# Patient Record
Sex: MALE | Race: OTHER | HISPANIC OR LATINO | URBAN - METROPOLITAN AREA
[De-identification: names, ages, dates, MRNs, and addresses within clinical notes are randomized per-mention and may not be internally consistent; named-entity substitution may affect disease eponyms.]

---

## 2021-07-12 VITALS
HEIGHT: 73 IN | TEMPERATURE: 98 F | DIASTOLIC BLOOD PRESSURE: 71 MMHG | HEART RATE: 56 BPM | OXYGEN SATURATION: 97 % | SYSTOLIC BLOOD PRESSURE: 126 MMHG | RESPIRATION RATE: 16 BRPM | WEIGHT: 169.76 LBS

## 2021-07-12 RX ORDER — POVIDONE-IODINE 5 %
1 AEROSOL (ML) TOPICAL ONCE
Refills: 0 | Status: COMPLETED | OUTPATIENT
Start: 2021-07-13 | End: 2021-07-13

## 2021-07-12 NOTE — H&P ADULT - PROBLEM SELECTOR PLAN 1
Admit to Orthopedic service  For elective L5-S1 decompression and fusion   Medically cleared for surgery by  Admit to Orthopedic service  For elective L5-S1 decompression and fusion   Medically cleared for surgery by Dr. Baca

## 2021-07-12 NOTE — H&P ADULT - NSHPLABSRESULTS_GEN_ALL_CORE
COVID vaccine (J&J) on 04/30/2021 Preop CBC, BMP, PT/INR, PTT WNL  SCr 0.97  Preop CXR WNL per clearance   Preop EKG WNL per clearance  Spirometry WNL per clearance   Echocardiogram - mild pulmonary regurgitation - reviewed per clearance  COVID vaccine on 04/30/2021  3M DOS  T&S DOS Preop CBC, BMP, PT/INR, PTT WNL  SCr 0.97  Preop CXR WNL per clearance   Preop EKG WNL per clearance  Spirometry WNL per clearance   Echocardiogram - mild pulmonary regurgitation - reviewed per clearance  COVID vaccine on 04/30/2021  Povidone iodine nasal swab to be given day of surgery   T&S DOS

## 2021-07-12 NOTE — H&P ADULT - HISTORY OF PRESENT ILLNESS
40 yo M with back pain x     Presents today for elective L5-S1 decompression and fusion. 40 y/o M with back pain, worsening without improvement despite conservative therapies.  Presents today for elective L5-S1 decompression and fusion.

## 2021-07-12 NOTE — H&P ADULT - NSHPPHYSICALEXAM_GEN_ALL_CORE
MSK: decreased ROM of lumbar spine secondary to pain    Rest of PE per medical clearance Complete PE as per medical clearance note

## 2021-07-13 ENCOUNTER — INPATIENT (INPATIENT)
Facility: HOSPITAL | Age: 41
LOS: 2 days | Discharge: HOME | DRG: 460 | End: 2021-07-16
Attending: ORTHOPAEDIC SURGERY | Admitting: ORTHOPAEDIC SURGERY
Payer: OTHER MISCELLANEOUS

## 2021-07-13 DIAGNOSIS — Z98.890 OTHER SPECIFIED POSTPROCEDURAL STATES: Chronic | ICD-10-CM

## 2021-07-13 DIAGNOSIS — M54.16 RADICULOPATHY, LUMBAR REGION: ICD-10-CM

## 2021-07-13 LAB
BLD GP AB SCN SERPL QL: NEGATIVE — SIGNIFICANT CHANGE UP
RH IG SCN BLD-IMP: POSITIVE — SIGNIFICANT CHANGE UP

## 2021-07-13 RX ORDER — ONDANSETRON 8 MG/1
4 TABLET, FILM COATED ORAL EVERY 6 HOURS
Refills: 0 | Status: DISCONTINUED | OUTPATIENT
Start: 2021-07-13 | End: 2021-07-16

## 2021-07-13 RX ORDER — HYDROMORPHONE HYDROCHLORIDE 2 MG/ML
30 INJECTION INTRAMUSCULAR; INTRAVENOUS; SUBCUTANEOUS
Refills: 0 | Status: DISCONTINUED | OUTPATIENT
Start: 2021-07-13 | End: 2021-07-14

## 2021-07-13 RX ORDER — SENNA PLUS 8.6 MG/1
2 TABLET ORAL AT BEDTIME
Refills: 0 | Status: DISCONTINUED | OUTPATIENT
Start: 2021-07-13 | End: 2021-07-16

## 2021-07-13 RX ORDER — DEXAMETHASONE 0.5 MG/5ML
10 ELIXIR ORAL EVERY 8 HOURS
Refills: 0 | Status: COMPLETED | OUTPATIENT
Start: 2021-07-13 | End: 2021-07-14

## 2021-07-13 RX ORDER — NALOXONE HYDROCHLORIDE 4 MG/.1ML
0.1 SPRAY NASAL
Refills: 0 | Status: DISCONTINUED | OUTPATIENT
Start: 2021-07-13 | End: 2021-07-16

## 2021-07-13 RX ORDER — ACETAMINOPHEN 500 MG
975 TABLET ORAL EVERY 8 HOURS
Refills: 0 | Status: DISCONTINUED | OUTPATIENT
Start: 2021-07-13 | End: 2021-07-16

## 2021-07-13 RX ORDER — AMITRIPTYLINE HCL 25 MG
1 TABLET ORAL
Qty: 0 | Refills: 0 | DISCHARGE

## 2021-07-13 RX ORDER — HYDROMORPHONE HYDROCHLORIDE 2 MG/ML
0.5 INJECTION INTRAMUSCULAR; INTRAVENOUS; SUBCUTANEOUS
Refills: 0 | Status: DISCONTINUED | OUTPATIENT
Start: 2021-07-13 | End: 2021-07-16

## 2021-07-13 RX ORDER — CYCLOBENZAPRINE HYDROCHLORIDE 10 MG/1
5 TABLET, FILM COATED ORAL THREE TIMES A DAY
Refills: 0 | Status: DISCONTINUED | OUTPATIENT
Start: 2021-07-13 | End: 2021-07-16

## 2021-07-13 RX ORDER — CHLORHEXIDINE GLUCONATE 213 G/1000ML
1 SOLUTION TOPICAL ONCE
Refills: 0 | Status: COMPLETED | OUTPATIENT
Start: 2021-07-13 | End: 2021-07-13

## 2021-07-13 RX ORDER — CEFAZOLIN SODIUM 1 G
2000 VIAL (EA) INJECTION EVERY 8 HOURS
Refills: 0 | Status: COMPLETED | OUTPATIENT
Start: 2021-07-14 | End: 2021-07-14

## 2021-07-13 RX ORDER — APREPITANT 80 MG/1
40 CAPSULE ORAL ONCE
Refills: 0 | Status: COMPLETED | OUTPATIENT
Start: 2021-07-13 | End: 2021-07-13

## 2021-07-13 RX ORDER — ROBINUL 0.2 MG/ML
0.2 INJECTION INTRAMUSCULAR; INTRAVENOUS ONCE
Refills: 0 | Status: COMPLETED | OUTPATIENT
Start: 2021-07-13 | End: 2021-07-13

## 2021-07-13 RX ORDER — SODIUM CHLORIDE 9 MG/ML
1000 INJECTION, SOLUTION INTRAVENOUS
Refills: 0 | Status: DISCONTINUED | OUTPATIENT
Start: 2021-07-13 | End: 2021-07-14

## 2021-07-13 RX ORDER — AMITRIPTYLINE HCL 25 MG
10 TABLET ORAL AT BEDTIME
Refills: 0 | Status: DISCONTINUED | OUTPATIENT
Start: 2021-07-13 | End: 2021-07-16

## 2021-07-13 RX ORDER — ACETAMINOPHEN 500 MG
1000 TABLET ORAL ONCE
Refills: 0 | Status: COMPLETED | OUTPATIENT
Start: 2021-07-13 | End: 2021-07-13

## 2021-07-13 RX ORDER — HYDROMORPHONE HYDROCHLORIDE 2 MG/ML
0.5 INJECTION INTRAMUSCULAR; INTRAVENOUS; SUBCUTANEOUS
Refills: 0 | Status: DISCONTINUED | OUTPATIENT
Start: 2021-07-13 | End: 2021-07-14

## 2021-07-13 RX ORDER — GABAPENTIN 400 MG/1
300 CAPSULE ORAL ONCE
Refills: 0 | Status: COMPLETED | OUTPATIENT
Start: 2021-07-13 | End: 2021-07-13

## 2021-07-13 RX ADMIN — APREPITANT 40 MILLIGRAM(S): 80 CAPSULE ORAL at 15:02

## 2021-07-13 RX ADMIN — Medication 102 MILLIGRAM(S): at 21:26

## 2021-07-13 RX ADMIN — Medication 1000 MILLIGRAM(S): at 15:02

## 2021-07-13 RX ADMIN — Medication 975 MILLIGRAM(S): at 21:31

## 2021-07-13 RX ADMIN — HYDROMORPHONE HYDROCHLORIDE 0.5 MILLIGRAM(S): 2 INJECTION INTRAMUSCULAR; INTRAVENOUS; SUBCUTANEOUS at 20:20

## 2021-07-13 RX ADMIN — Medication 1 APPLICATION(S): at 15:05

## 2021-07-13 RX ADMIN — HYDROMORPHONE HYDROCHLORIDE 30 MILLILITER(S): 2 INJECTION INTRAMUSCULAR; INTRAVENOUS; SUBCUTANEOUS at 20:31

## 2021-07-13 RX ADMIN — SENNA PLUS 2 TABLET(S): 8.6 TABLET ORAL at 21:31

## 2021-07-13 RX ADMIN — ROBINUL 0.2 MILLIGRAM(S): 0.2 INJECTION INTRAMUSCULAR; INTRAVENOUS at 19:15

## 2021-07-13 RX ADMIN — CYCLOBENZAPRINE HYDROCHLORIDE 5 MILLIGRAM(S): 10 TABLET, FILM COATED ORAL at 21:31

## 2021-07-13 RX ADMIN — GABAPENTIN 300 MILLIGRAM(S): 400 CAPSULE ORAL at 15:02

## 2021-07-13 RX ADMIN — Medication 10 MILLIGRAM(S): at 21:31

## 2021-07-13 RX ADMIN — CHLORHEXIDINE GLUCONATE 1 APPLICATION(S): 213 SOLUTION TOPICAL at 15:05

## 2021-07-13 RX ADMIN — HYDROMORPHONE HYDROCHLORIDE 0.5 MILLIGRAM(S): 2 INJECTION INTRAMUSCULAR; INTRAVENOUS; SUBCUTANEOUS at 20:03
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
## 2021-07-13 NOTE — CONSULT NOTE ADULT - ATTENDING COMMENTS
Pt is for lumbar decompression, is evaluated for pain control. Chart reviewed, plan reviewed and agreed. We will consider IV PCA and comprehensive regimen and titrate to pain control and side effects. We will follow up. Dr. Graves

## 2021-07-14 LAB
ANION GAP SERPL CALC-SCNC: 12 MMOL/L — SIGNIFICANT CHANGE UP (ref 5–17)
BASOPHILS # BLD AUTO: 0.01 K/UL — SIGNIFICANT CHANGE UP (ref 0–0.2)
BASOPHILS NFR BLD AUTO: 0.1 % — SIGNIFICANT CHANGE UP (ref 0–2)
BUN SERPL-MCNC: 12 MG/DL — SIGNIFICANT CHANGE UP (ref 7–23)
CALCIUM SERPL-MCNC: 9.4 MG/DL — SIGNIFICANT CHANGE UP (ref 8.4–10.5)
CHLORIDE SERPL-SCNC: 101 MMOL/L — SIGNIFICANT CHANGE UP (ref 96–108)
CO2 SERPL-SCNC: 23 MMOL/L — SIGNIFICANT CHANGE UP (ref 22–31)
COVID-19 SPIKE DOMAIN AB INTERP: POSITIVE
COVID-19 SPIKE DOMAIN ANTIBODY RESULT: >250 U/ML — HIGH
CREAT SERPL-MCNC: 0.81 MG/DL — SIGNIFICANT CHANGE UP (ref 0.5–1.3)
EOSINOPHIL # BLD AUTO: 0 K/UL — SIGNIFICANT CHANGE UP (ref 0–0.5)
EOSINOPHIL NFR BLD AUTO: 0 % — SIGNIFICANT CHANGE UP (ref 0–6)
GLUCOSE SERPL-MCNC: 149 MG/DL — HIGH (ref 70–99)
HCT VFR BLD CALC: 38 % — LOW (ref 39–50)
HGB BLD-MCNC: 12.5 G/DL — LOW (ref 13–17)
IMM GRANULOCYTES NFR BLD AUTO: 0.9 % — SIGNIFICANT CHANGE UP (ref 0–1.5)
LYMPHOCYTES # BLD AUTO: 0.69 K/UL — LOW (ref 1–3.3)
LYMPHOCYTES # BLD AUTO: 3.8 % — LOW (ref 13–44)
MCHC RBC-ENTMCNC: 27 PG — SIGNIFICANT CHANGE UP (ref 27–34)
MCHC RBC-ENTMCNC: 32.9 GM/DL — SIGNIFICANT CHANGE UP (ref 32–36)
MCV RBC AUTO: 82.1 FL — SIGNIFICANT CHANGE UP (ref 80–100)
MONOCYTES # BLD AUTO: 0.4 K/UL — SIGNIFICANT CHANGE UP (ref 0–0.9)
MONOCYTES NFR BLD AUTO: 2.2 % — SIGNIFICANT CHANGE UP (ref 2–14)
NEUTROPHILS # BLD AUTO: 16.97 K/UL — HIGH (ref 1.8–7.4)
NEUTROPHILS NFR BLD AUTO: 93 % — HIGH (ref 43–77)
NRBC # BLD: 0 /100 WBCS — SIGNIFICANT CHANGE UP (ref 0–0)
PLATELET # BLD AUTO: 255 K/UL — SIGNIFICANT CHANGE UP (ref 150–400)
POTASSIUM SERPL-MCNC: 4.2 MMOL/L — SIGNIFICANT CHANGE UP (ref 3.5–5.3)
POTASSIUM SERPL-SCNC: 4.2 MMOL/L — SIGNIFICANT CHANGE UP (ref 3.5–5.3)
RBC # BLD: 4.63 M/UL — SIGNIFICANT CHANGE UP (ref 4.2–5.8)
RBC # FLD: 13 % — SIGNIFICANT CHANGE UP (ref 10.3–14.5)
SARS-COV-2 IGG+IGM SERPL QL IA: >250 U/ML — HIGH
SARS-COV-2 IGG+IGM SERPL QL IA: POSITIVE
SODIUM SERPL-SCNC: 136 MMOL/L — SIGNIFICANT CHANGE UP (ref 135–145)
WBC # BLD: 18.24 K/UL — HIGH (ref 3.8–10.5)
WBC # FLD AUTO: 18.24 K/UL — HIGH (ref 3.8–10.5)

## 2021-07-14 PROCEDURE — 72100 X-RAY EXAM L-S SPINE 2/3 VWS: CPT | Mod: 26

## 2021-07-14 RX ORDER — HYDROMORPHONE HYDROCHLORIDE 2 MG/ML
0.5 INJECTION INTRAMUSCULAR; INTRAVENOUS; SUBCUTANEOUS
Refills: 0 | Status: DISCONTINUED | OUTPATIENT
Start: 2021-07-14 | End: 2021-07-16

## 2021-07-14 RX ORDER — OXYCODONE HYDROCHLORIDE 5 MG/1
5 TABLET ORAL EVERY 4 HOURS
Refills: 0 | Status: DISCONTINUED | OUTPATIENT
Start: 2021-07-14 | End: 2021-07-16

## 2021-07-14 RX ORDER — POLYETHYLENE GLYCOL 3350 17 G/17G
17 POWDER, FOR SOLUTION ORAL DAILY
Refills: 0 | Status: DISCONTINUED | OUTPATIENT
Start: 2021-07-14 | End: 2021-07-16

## 2021-07-14 RX ORDER — OXYCODONE HYDROCHLORIDE 5 MG/1
10 TABLET ORAL EVERY 4 HOURS
Refills: 0 | Status: DISCONTINUED | OUTPATIENT
Start: 2021-07-14 | End: 2021-07-16

## 2021-07-14 RX ADMIN — Medication 975 MILLIGRAM(S): at 23:14

## 2021-07-14 RX ADMIN — Medication 102 MILLIGRAM(S): at 18:15

## 2021-07-14 RX ADMIN — Medication 975 MILLIGRAM(S): at 13:02

## 2021-07-14 RX ADMIN — SODIUM CHLORIDE 125 MILLILITER(S): 9 INJECTION, SOLUTION INTRAVENOUS at 02:15

## 2021-07-14 RX ADMIN — OXYCODONE HYDROCHLORIDE 10 MILLIGRAM(S): 5 TABLET ORAL at 20:29

## 2021-07-14 RX ADMIN — Medication 1 TABLET(S): at 13:02

## 2021-07-14 RX ADMIN — HYDROMORPHONE HYDROCHLORIDE 0.5 MILLIGRAM(S): 2 INJECTION INTRAMUSCULAR; INTRAVENOUS; SUBCUTANEOUS at 15:35

## 2021-07-14 RX ADMIN — Medication 10 MILLIGRAM(S): at 22:13

## 2021-07-14 RX ADMIN — POLYETHYLENE GLYCOL 3350 17 GRAM(S): 17 POWDER, FOR SOLUTION ORAL at 13:02

## 2021-07-14 RX ADMIN — OXYCODONE HYDROCHLORIDE 10 MILLIGRAM(S): 5 TABLET ORAL at 17:41

## 2021-07-14 RX ADMIN — CYCLOBENZAPRINE HYDROCHLORIDE 5 MILLIGRAM(S): 10 TABLET, FILM COATED ORAL at 13:02

## 2021-07-14 RX ADMIN — CYCLOBENZAPRINE HYDROCHLORIDE 5 MILLIGRAM(S): 10 TABLET, FILM COATED ORAL at 06:46

## 2021-07-14 RX ADMIN — OXYCODONE HYDROCHLORIDE 10 MILLIGRAM(S): 5 TABLET ORAL at 21:29

## 2021-07-14 RX ADMIN — Medication 102 MILLIGRAM(S): at 11:02

## 2021-07-14 RX ADMIN — SENNA PLUS 2 TABLET(S): 8.6 TABLET ORAL at 22:13

## 2021-07-14 RX ADMIN — Medication 975 MILLIGRAM(S): at 14:02

## 2021-07-14 RX ADMIN — Medication 100 MILLIGRAM(S): at 10:28

## 2021-07-14 RX ADMIN — OXYCODONE HYDROCHLORIDE 10 MILLIGRAM(S): 5 TABLET ORAL at 13:11

## 2021-07-14 RX ADMIN — HYDROMORPHONE HYDROCHLORIDE 0.5 MILLIGRAM(S): 2 INJECTION INTRAMUSCULAR; INTRAVENOUS; SUBCUTANEOUS at 15:50

## 2021-07-14 RX ADMIN — OXYCODONE HYDROCHLORIDE 10 MILLIGRAM(S): 5 TABLET ORAL at 12:11

## 2021-07-14 RX ADMIN — Medication 100 MILLIGRAM(S): at 02:15

## 2021-07-14 RX ADMIN — Medication 975 MILLIGRAM(S): at 06:46

## 2021-07-14 RX ADMIN — Medication 975 MILLIGRAM(S): at 22:13

## 2021-07-14 RX ADMIN — Medication 975 MILLIGRAM(S): at 07:46

## 2021-07-14 RX ADMIN — CYCLOBENZAPRINE HYDROCHLORIDE 5 MILLIGRAM(S): 10 TABLET, FILM COATED ORAL at 22:13

## 2021-07-14 RX ADMIN — OXYCODONE HYDROCHLORIDE 10 MILLIGRAM(S): 5 TABLET ORAL at 16:41

## 2021-07-14 NOTE — CONSULT NOTE ADULT - SUBJECTIVE AND OBJECTIVE BOX
DEMARCO AVILA      Patient is a 41y old  Male who presents with a chief complaint of Back pain (14 Jul 2021 16:17)      HPI:  42 y/o M with back pain, worsening without improvement despite conservative therapies.  Presents today for elective L5-S1 decompression and fusion. (12 Jul 2021 09:12)      Addl  Medical issues:       HEALTH ISSUES - PROBLEM Dx:  Lumbar radiculopathy  Lumbar radiculopathy              MEDICATIONS  (STANDING):  acetaminophen   Tablet .. 975 milliGRAM(s) Oral every 8 hours  amitriptyline 10 milliGRAM(s) Oral at bedtime  cyclobenzaprine 5 milliGRAM(s) Oral three times a day  multivitamin 1 Tablet(s) Oral daily  polyethylene glycol 3350 17 Gram(s) Oral daily  senna 2 Tablet(s) Oral at bedtime    MEDICATIONS  (PRN):  bisacodyl Suppository 10 milliGRAM(s) Rectal daily PRN Constipation  HYDROmorphone  Injectable 0.5 milliGRAM(s) IV Push every 15 minutes PRN Severe Pain (7 - 10)  HYDROmorphone  Injectable 0.5 milliGRAM(s) IV Push every 2 hours PRN breakthrough pain  naloxone Injectable 0.1 milliGRAM(s) IV Push every 3 minutes PRN For ANY of the following changes in patient status:  A. RR LESS THAN 10 breaths per minute, B. Oxygen saturation LESS THAN 90%, C. Sedation score of 6  ondansetron Injectable 4 milliGRAM(s) IV Push every 6 hours PRN Nausea  oxyCODONE    IR 5 milliGRAM(s) Oral every 4 hours PRN Moderate Pain (4 - 6)  oxyCODONE    IR 10 milliGRAM(s) Oral every 4 hours PRN Severe Pain (7 - 10)          PAST MEDICAL & SURGICAL HISTORY:  No pertinent past medical history    History of surgery  left arm surgery        REVIEW OF SYSTEMS:  [x] As per HPI          Reviewed   no change                            Changes noted  CONSTITUTIONAL: No fever, weight loss, or fatigue  RESPIRATORY: No cough, wheezing, chills or hemoptysis; No Shortness of Breath  CARDIOVASCULAR: No chest pain, palpitations, dizziness, or leg swelling  GASTROINTESTINAL: No abdominal or epigastric pain. No nausea, vomiting, or hematemesis; No diarrhea or constipation. No melena or hematochezia.  MUSCULOSKELETAL: No joint pain or swelling; No muscle, back, or extremity pain  s/p L spine  Neuro:   Grossly  Negative  Psych        Awake  alert  [x] All others negative	  [ ] Unable to obtain      PHYSICAL EXAM:      Constitutional: NAD, well-groomed, well-developed  HEENT: PERRLA, EOMI, Normal Hearing, MMM  Neck: No LAD, No JVD  Back: Normal spine flexure, No CVA tenderness  s/p surg  Respiratory: CTA   Cardiovascular: S1 and S2, RRR, no M/G/R  Gastrointestinal: BS+, soft, NT/ND  Extremities: No peripheral edema  Vascular: 2+ peripheral pulses  Neurological: A/O x 3, no focal deficits  Psychiatric: Normal mood, normal affect  Musculoskeletal: 5/5 strength b/l upper and lower extremities  Skin: No rashes    Vital Signs Last 24 Hrs  T(C): 36.6 (14 Jul 2021 20:25), Max: 36.9 (14 Jul 2021 01:16)  T(F): 97.8 (14 Jul 2021 20:25), Max: 98.4 (14 Jul 2021 01:16)  HR: 79 (14 Jul 2021 20:25) (56 - 87)  BP: 133/71 (14 Jul 2021 20:25) (110/64 - 144/72)  BP(mean): 85 (14 Jul 2021 06:20) (85 - 95)  RR: 18 (14 Jul 2021 20:25) (16 - 21)  SpO2: 95% (14 Jul 2021 20:25) (95% - 98%)    PHYSICAL EXAM:                                    12.5   18.24 )-----------( 255      ( 14 Jul 2021 07:16 )             38.0     07-14    136  |  101  |  12  ----------------------------<  149<H>  4.2   |  23  |  0.81    Ca    9.4      14 Jul 2021 07:16            CAPILLARY BLOOD GLUCOSE          I&O's Summary    13 Jul 2021 07:01  -  14 Jul 2021 07:00  --------------------------------------------------------  IN: 4665 mL / OUT: 2865 mL / NET: 1800 mL    14 Jul 2021 07:01  -  14 Jul 2021 21:16  --------------------------------------------------------  IN: 240 mL / OUT: 2660 mL / NET: -2420 mL            ASSESSMENT/PLAN/RECOMMENDATIONS

## 2021-07-14 NOTE — PHYSICAL THERAPY INITIAL EVALUATION ADULT - GENERAL OBSERVATIONS, REHAB EVAL
Pt received semi-supine no acute distress +IV +SCDs +hemovac, cleared for PT by RYLAND Zhong, agreeable to PT Eval. Left as found +call bell, RN aware.

## 2021-07-14 NOTE — PROGRESS NOTE ADULT - ATTENDING COMMENTS
Pt is s/p lumbar decompression and fusion, POD # 1, is evaluated for pain control. Chart reviewed, plan reviewed and agreed. Reports adequate pain control except some pain on movements on current regimen w/o any side effects. We will continue the comprehensive regimen and titrate to pain control and side effects. We will follow up. Dr. Graves

## 2021-07-14 NOTE — PHYSICAL THERAPY INITIAL EVALUATION ADULT - PERTINENT HX OF CURRENT PROBLEM, REHAB EVAL
40 y/o M with back pain, worsening without improvement despite conservative therapies.  Presents today for elective L5-S1 decompression and fusion.

## 2021-07-15 LAB
ANION GAP SERPL CALC-SCNC: 12 MMOL/L — SIGNIFICANT CHANGE UP (ref 5–17)
ANISOCYTOSIS BLD QL: SLIGHT — SIGNIFICANT CHANGE UP
BASOPHILS # BLD AUTO: 0 K/UL — SIGNIFICANT CHANGE UP (ref 0–0.2)
BASOPHILS NFR BLD AUTO: 0 % — SIGNIFICANT CHANGE UP (ref 0–2)
BUN SERPL-MCNC: 16 MG/DL — SIGNIFICANT CHANGE UP (ref 7–23)
CALCIUM SERPL-MCNC: 9.3 MG/DL — SIGNIFICANT CHANGE UP (ref 8.4–10.5)
CHLORIDE SERPL-SCNC: 102 MMOL/L — SIGNIFICANT CHANGE UP (ref 96–108)
CO2 SERPL-SCNC: 24 MMOL/L — SIGNIFICANT CHANGE UP (ref 22–31)
CREAT SERPL-MCNC: 0.81 MG/DL — SIGNIFICANT CHANGE UP (ref 0.5–1.3)
EOSINOPHIL # BLD AUTO: 0 K/UL — SIGNIFICANT CHANGE UP (ref 0–0.5)
EOSINOPHIL NFR BLD AUTO: 0 % — SIGNIFICANT CHANGE UP (ref 0–6)
GLUCOSE SERPL-MCNC: 136 MG/DL — HIGH (ref 70–99)
HCT VFR BLD CALC: 35.8 % — LOW (ref 39–50)
HGB BLD-MCNC: 11.6 G/DL — LOW (ref 13–17)
HYPOCHROMIA BLD QL: SLIGHT — SIGNIFICANT CHANGE UP
LYMPHOCYTES # BLD AUTO: 1.61 K/UL — SIGNIFICANT CHANGE UP (ref 1–3.3)
LYMPHOCYTES # BLD AUTO: 6 % — LOW (ref 13–44)
MACROCYTES BLD QL: SLIGHT — SIGNIFICANT CHANGE UP
MANUAL SMEAR VERIFICATION: SIGNIFICANT CHANGE UP
MCHC RBC-ENTMCNC: 27 PG — SIGNIFICANT CHANGE UP (ref 27–34)
MCHC RBC-ENTMCNC: 32.4 GM/DL — SIGNIFICANT CHANGE UP (ref 32–36)
MCV RBC AUTO: 83.3 FL — SIGNIFICANT CHANGE UP (ref 80–100)
MONOCYTES # BLD AUTO: 1.86 K/UL — HIGH (ref 0–0.9)
MONOCYTES NFR BLD AUTO: 6.9 % — SIGNIFICANT CHANGE UP (ref 2–14)
NEUTROPHILS # BLD AUTO: 23.18 K/UL — HIGH (ref 1.8–7.4)
NEUTROPHILS NFR BLD AUTO: 84.5 % — HIGH (ref 43–77)
NEUTS BAND # BLD: 1.7 % — SIGNIFICANT CHANGE UP (ref 0–8)
PLAT MORPH BLD: ABNORMAL
PLATELET # BLD AUTO: 253 K/UL — SIGNIFICANT CHANGE UP (ref 150–400)
POLYCHROMASIA BLD QL SMEAR: SLIGHT — SIGNIFICANT CHANGE UP
POTASSIUM SERPL-MCNC: 4 MMOL/L — SIGNIFICANT CHANGE UP (ref 3.5–5.3)
POTASSIUM SERPL-SCNC: 4 MMOL/L — SIGNIFICANT CHANGE UP (ref 3.5–5.3)
RBC # BLD: 4.3 M/UL — SIGNIFICANT CHANGE UP (ref 4.2–5.8)
RBC # FLD: 13.6 % — SIGNIFICANT CHANGE UP (ref 10.3–14.5)
RBC BLD AUTO: ABNORMAL
SODIUM SERPL-SCNC: 138 MMOL/L — SIGNIFICANT CHANGE UP (ref 135–145)
VARIANT LYMPHS # BLD: 0.9 % — SIGNIFICANT CHANGE UP (ref 0–6)
WBC # BLD: 26.89 K/UL — HIGH (ref 3.8–10.5)
WBC # FLD AUTO: 26.89 K/UL — HIGH (ref 3.8–10.5)

## 2021-07-15 RX ORDER — ACETAMINOPHEN 500 MG
3 TABLET ORAL
Qty: 0 | Refills: 0 | DISCHARGE
Start: 2021-07-15

## 2021-07-15 RX ORDER — DICLOFENAC SODIUM 75 MG/1
1 TABLET, DELAYED RELEASE ORAL
Qty: 0 | Refills: 0 | DISCHARGE

## 2021-07-15 RX ORDER — SENNA PLUS 8.6 MG/1
2 TABLET ORAL
Qty: 0 | Refills: 0 | DISCHARGE
Start: 2021-07-15

## 2021-07-15 RX ORDER — PANTOPRAZOLE SODIUM 20 MG/1
40 TABLET, DELAYED RELEASE ORAL
Refills: 0 | Status: DISCONTINUED | OUTPATIENT
Start: 2021-07-15 | End: 2021-07-16

## 2021-07-15 RX ORDER — OXYCODONE HYDROCHLORIDE 5 MG/1
1 TABLET ORAL
Qty: 40 | Refills: 0
Start: 2021-07-15 | End: 2021-07-21

## 2021-07-15 RX ORDER — CYCLOBENZAPRINE HYDROCHLORIDE 10 MG/1
1 TABLET, FILM COATED ORAL
Qty: 21 | Refills: 0
Start: 2021-07-15 | End: 2021-07-21

## 2021-07-15 RX ORDER — POLYETHYLENE GLYCOL 3350 17 G/17G
17 POWDER, FOR SOLUTION ORAL
Qty: 0 | Refills: 0 | DISCHARGE
Start: 2021-07-15

## 2021-07-15 RX ORDER — GABAPENTIN 400 MG/1
1 CAPSULE ORAL
Qty: 0 | Refills: 0 | DISCHARGE

## 2021-07-15 RX ORDER — MAGNESIUM HYDROXIDE 400 MG/1
30 TABLET, CHEWABLE ORAL DAILY
Refills: 0 | Status: DISCONTINUED | OUTPATIENT
Start: 2021-07-15 | End: 2021-07-16

## 2021-07-15 RX ORDER — FAMOTIDINE 10 MG/ML
20 INJECTION INTRAVENOUS DAILY
Refills: 0 | Status: DISCONTINUED | OUTPATIENT
Start: 2021-07-15 | End: 2021-07-16

## 2021-07-15 RX ORDER — LACTULOSE 10 G/15ML
10 SOLUTION ORAL ONCE
Refills: 0 | Status: COMPLETED | OUTPATIENT
Start: 2021-07-15 | End: 2021-07-15

## 2021-07-15 RX ADMIN — SENNA PLUS 2 TABLET(S): 8.6 TABLET ORAL at 21:21

## 2021-07-15 RX ADMIN — HYDROMORPHONE HYDROCHLORIDE 0.5 MILLIGRAM(S): 2 INJECTION INTRAMUSCULAR; INTRAVENOUS; SUBCUTANEOUS at 23:36

## 2021-07-15 RX ADMIN — OXYCODONE HYDROCHLORIDE 10 MILLIGRAM(S): 5 TABLET ORAL at 21:21

## 2021-07-15 RX ADMIN — Medication 975 MILLIGRAM(S): at 05:13

## 2021-07-15 RX ADMIN — CYCLOBENZAPRINE HYDROCHLORIDE 5 MILLIGRAM(S): 10 TABLET, FILM COATED ORAL at 05:13

## 2021-07-15 RX ADMIN — Medication 975 MILLIGRAM(S): at 13:22

## 2021-07-15 RX ADMIN — OXYCODONE HYDROCHLORIDE 10 MILLIGRAM(S): 5 TABLET ORAL at 17:50

## 2021-07-15 RX ADMIN — Medication 975 MILLIGRAM(S): at 13:54

## 2021-07-15 RX ADMIN — OXYCODONE HYDROCHLORIDE 10 MILLIGRAM(S): 5 TABLET ORAL at 06:13

## 2021-07-15 RX ADMIN — Medication 975 MILLIGRAM(S): at 22:21

## 2021-07-15 RX ADMIN — OXYCODONE HYDROCHLORIDE 10 MILLIGRAM(S): 5 TABLET ORAL at 12:16

## 2021-07-15 RX ADMIN — CYCLOBENZAPRINE HYDROCHLORIDE 5 MILLIGRAM(S): 10 TABLET, FILM COATED ORAL at 13:22

## 2021-07-15 RX ADMIN — Medication 1 ENEMA: at 16:05

## 2021-07-15 RX ADMIN — Medication 1 TABLET(S): at 13:22

## 2021-07-15 RX ADMIN — Medication 975 MILLIGRAM(S): at 21:21

## 2021-07-15 RX ADMIN — OXYCODONE HYDROCHLORIDE 10 MILLIGRAM(S): 5 TABLET ORAL at 22:21

## 2021-07-15 RX ADMIN — Medication 10 MILLIGRAM(S): at 21:21

## 2021-07-15 RX ADMIN — FAMOTIDINE 20 MILLIGRAM(S): 10 INJECTION INTRAVENOUS at 22:25

## 2021-07-15 RX ADMIN — POLYETHYLENE GLYCOL 3350 17 GRAM(S): 17 POWDER, FOR SOLUTION ORAL at 13:22

## 2021-07-15 RX ADMIN — Medication 24 MILLIGRAM(S): at 07:09

## 2021-07-15 RX ADMIN — OXYCODONE HYDROCHLORIDE 10 MILLIGRAM(S): 5 TABLET ORAL at 05:13

## 2021-07-15 RX ADMIN — HYDROMORPHONE HYDROCHLORIDE 0.5 MILLIGRAM(S): 2 INJECTION INTRAMUSCULAR; INTRAVENOUS; SUBCUTANEOUS at 23:11

## 2021-07-15 RX ADMIN — LACTULOSE 10 GRAM(S): 10 SOLUTION ORAL at 07:53

## 2021-07-15 RX ADMIN — OXYCODONE HYDROCHLORIDE 10 MILLIGRAM(S): 5 TABLET ORAL at 17:17

## 2021-07-15 RX ADMIN — Medication 975 MILLIGRAM(S): at 06:13

## 2021-07-15 RX ADMIN — OXYCODONE HYDROCHLORIDE 10 MILLIGRAM(S): 5 TABLET ORAL at 12:50

## 2021-07-15 RX ADMIN — CYCLOBENZAPRINE HYDROCHLORIDE 5 MILLIGRAM(S): 10 TABLET, FILM COATED ORAL at 21:21

## 2021-07-15 NOTE — DISCHARGE NOTE PROVIDER - NSDCCPCAREPLAN_GEN_ALL_CORE_FT
PRINCIPAL DISCHARGE DIAGNOSIS  Diagnosis: Lumbar radiculopathy  Assessment and Plan of Treatment: L5-S1 lami/PSF

## 2021-07-15 NOTE — DISCHARGE NOTE PROVIDER - NSDCMRMEDTOKEN_GEN_ALL_CORE_FT
amitriptyline 10 mg oral tablet: 1 tab(s) orally once a day (at bedtime)  diclofenac sodium 50 mg oral delayed release tablet: 1 tab(s) orally 3 times a day  gabapentin 300 mg oral capsule: 1 cap(s) orally 3 times a day  lumbar corset x 1 :    acetaminophen 325 mg oral tablet: 3 tab(s) orally every 8 hours for one week then as needed  Do not exceed 4000mg/day  amitriptyline 10 mg oral tablet: 1 tab(s) orally once a day (at bedtime)  bisacodyl 10 mg rectal suppository: 1 suppository(ies) rectal once a day, As needed, Constipation  cefadroxil 500 mg oral capsule: 1 cap(s) orally every 12 hours   cyclobenzaprine 5 mg oral tablet: 1 tab(s) orally 3 times a day  lumbar corset x 1 :   Medrol Dosepak 4 mg oral tablet: Take as directed on the pack  Multiple Vitamins oral tablet: 1 tab(s) orally once a day  oxyCODONE 5 mg oral tablet: 1 to 2 tab(s) orally every 4 hours, As needed, Moderate to severe Pain (4 - 6) MDD:12  polyethylene glycol 3350 oral powder for reconstitution: 17 gram(s) orally once a day  senna oral tablet: 2 tab(s) orally once a day (at bedtime)

## 2021-07-15 NOTE — DISCHARGE NOTE PROVIDER - NSDCCPTREATMENT_GEN_ALL_CORE_FT
The history is provided by the patient. Abdominal Pain This is a recurrent problem. The current episode started more than 2 days ago. The problem occurs constantly. The problem has been gradually worsening. The pain is associated with stress. The pain is located in the epigastric region. The quality of the pain is sharp. The pain is severe. Associated symptoms include nausea. Pertinent negatives include no fever, no diarrhea, no vomiting, no constipation, no dysuria, no headaches, no arthralgias, no myalgias, no trauma and no chest pain. The pain is worsened by activity and eating. The pain is relieved by nothing. Past workup includes surgery. The patient's surgical history includes appendectomy, cholecystectomy and hysterectomy. small bowel resection Past Medical History:  
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 s/p TAHBSO, per pt.  Other ill-defined conditions(799.89) pancreatitis  Pancreatitis  Psychiatric disorder   
 anxiety and depression  Seizures ([facility] [address]   
 off of meds (klonopin/trazadone) since 5/09 Past Surgical History:  
Procedure Laterality Date  HX APPENDECTOMY  HX CHOLECYSTECTOMY  HX GYN    
 hysterectomy  HX OTHER SURGICAL    
 hernia  HX SMALL BOWEL RESECTION    
 mesh in abdomen  INSERT MESH/PELVIC FLR ADDON Family History:  
Problem Relation Age of Onset  Cancer Maternal Aunt   
     ovarian Cancer  Cancer Maternal Grandfather Colon Cancer  Diabetes Other   
     multiple family members on both sides M/F Social History Socioeconomic History  Marital status: LEGALLY  Spouse name: Not on file  Number of children: Not on file  Years of education: Not on file  Highest education level: Not on file Social Needs  Financial resource strain: Not on file  Food insecurity - worry: Not on file  Food insecurity - inability: Not on file  Transportation needs - medical: Not on file  Transportation needs - non-medical: Not on file Occupational History  Not on file Tobacco Use  Smoking status: Current Every Day Smoker Packs/day: 1.00 Years: 10.00 Pack years: 10.00  Smokeless tobacco: Never Used Substance and Sexual Activity  Alcohol use: No  
 Drug use: No  
 Sexual activity: No  
Other Topics Concern  Not on file Social History Narrative  Not on file ALLERGIES: Darvocet a500 [propoxyphene n-acetaminophen]; Ibuprofen; and Toradol [ketorolac] Review of Systems Constitutional: Negative for chills and fever. HENT: Negative for congestion, ear pain and rhinorrhea. Eyes: Negative for photophobia and discharge. Respiratory: Negative for cough and shortness of breath. Cardiovascular: Negative for chest pain and palpitations. Gastrointestinal: Positive for abdominal pain and nausea. Negative for constipation, diarrhea and vomiting. Endocrine: Negative for cold intolerance and heat intolerance. Genitourinary: Negative for dysuria and flank pain. Musculoskeletal: Negative for arthralgias, myalgias and neck pain. Skin: Negative for rash and wound. Allergic/Immunologic: Negative for environmental allergies and food allergies. Neurological: Negative for syncope and headaches. Hematological: Negative for adenopathy. Does not bruise/bleed easily. Psychiatric/Behavioral: Positive for dysphoric mood. The patient is nervous/anxious. All other systems reviewed and are negative. Vitals:  
 02/17/19 1702 BP: 156/89 Pulse: 82 Resp: 20 Temp: 98.7 °F (37.1 °C) SpO2: 99% Weight: 61.2 kg (135 lb) Height: 5' 4\" (1.626 m) Physical Exam  
Constitutional: She is oriented to person, place, and time. She appears well-developed and well-nourished. Non-toxic appearance. She appears distressed.   
HENT:  
 Head: Normocephalic and atraumatic. Right Ear: External ear normal.  
Left Ear: External ear normal.  
Mouth/Throat: Oropharynx is clear and moist. No oropharyngeal exudate. Eyes: Conjunctivae and EOM are normal. Pupils are equal, round, and reactive to light. Neck: Normal range of motion. Neck supple. No JVD present. Cardiovascular: Normal rate, regular rhythm, normal heart sounds and intact distal pulses. Exam reveals no gallop and no friction rub. No murmur heard. Pulmonary/Chest: Effort normal and breath sounds normal.  
Abdominal: Soft. Normal appearance. She exhibits no distension and no mass. Bowel sounds are decreased. There is no hepatosplenomegaly. There is tenderness in the epigastric area. There is no rigidity, no rebound and no guarding. Musculoskeletal: Normal range of motion. She exhibits no edema or deformity. Neurological: She is alert and oriented to person, place, and time. She has normal strength. No cranial nerve deficit or sensory deficit. She displays a negative Romberg sign. Gait normal.  
Skin: Skin is warm and dry. Capillary refill takes less than 2 seconds. No rash noted. Psychiatric: Her speech is normal and behavior is normal. Judgment and thought content normal. Her affect is blunt. Cognition and memory are normal. She exhibits a depressed mood. Nursing note and vitals reviewed. MDM Number of Diagnoses or Management Options Abdominal pain, epigastric: established and worsening Other chronic pain: established and worsening Diagnosis management comments: EKG reviewed Sinus rhythm with left axis deviation//left anterior fascicular block No ectopy No acute ischemic changes Amount and/or Complexity of Data Reviewed Clinical lab tests: ordered and reviewed Tests in the radiology section of CPT®: ordered and reviewed Tests in the medicine section of CPT®: ordered and reviewed Review and summarize past medical records: yes Risk of Complications, Morbidity, and/or Mortality Presenting problems: moderate Diagnostic procedures: moderate Management options: moderate General comments: Elements of this note have been dictated via voice recognition software. Text and phrases may be limited by the accuracy of the software. The chart has been reviewed, but errors may still be present. Patient Progress Patient progress: improved Procedures PRINCIPAL PROCEDURE  Procedure: Decompression with fusion of lumbar spine  Findings and Treatment: lumbar radiculopathy

## 2021-07-15 NOTE — DISCHARGE NOTE PROVIDER - CARE PROVIDER_API CALL
James King (DO)  Orthopaedic Surgery  [address] [place], [place] [address]  Phone: [phone]  Fax: [phone]  Follow Up Time: 2 weeks

## 2021-07-15 NOTE — DISCHARGE NOTE PROVIDER - HOSPITAL COURSE
Admitted 7/13/21  Surgery L5-S1 lami/fusion  Jenni-op Antibiotics  Pain control  DVT prophylaxis  OOB/Physical Therapy

## 2021-07-15 NOTE — DISCHARGE NOTE PROVIDER - NSDCFUADDINST_GEN_ALL_CORE_FT
No strenuous activity (bending/twisting), heavy lifting, driving or returning to work until cleared by MD.  Change dressing daily with gauze/tape till post-op day #5, then leave incision open to air.  You may shower post-op day#5, keep incision clean and dry.   Try to have regular bowel movements, take stool softener or laxative if necessary.  May apply ice to affected areas to decrease swelling.  Call to schedule an appt with Dr. King for follow up, if you have staples or sutures they will be removed in office.  Contact your doctor if you experience: fever greater than 101.5, chills, chest pain, difficulty breathing, redness or excessive drainage around the incision, other concerns.  Follow up with your primary care provider.   No strenuous activity (bending/twisting), heavy lifting, driving or returning to work until cleared by MD.  Change dressing daily with gauze/tape till post-op day #5, then leave incision open to air.  You may shower post-op day#5, keep incision clean and dry.   Try to have regular bowel movements, take stool softener or laxative if necessary.  May apply ice to affected areas to decrease swelling.  Call to schedule an appt with Dr. King for follow up, if you have staples or sutures they will be removed in office.  Contact your doctor if you experience: fever greater than 101.5, chills, chest pain, difficulty breathing, redness or excessive drainage around the incision, other concerns.  Follow up with your primary care provider to repeat blood work (CBC -WBC elevated from steroids)  Take antibiotic with food as directed.  Take Medro pack as directed on the pack

## 2021-07-16 VITALS — WEIGHT: 169.98 LBS

## 2021-07-16 LAB
ANION GAP SERPL CALC-SCNC: 10 MMOL/L — SIGNIFICANT CHANGE UP (ref 5–17)
BASOPHILS # BLD AUTO: 0.03 K/UL — SIGNIFICANT CHANGE UP (ref 0–0.2)
BASOPHILS NFR BLD AUTO: 0.2 % — SIGNIFICANT CHANGE UP (ref 0–2)
BUN SERPL-MCNC: 16 MG/DL — SIGNIFICANT CHANGE UP (ref 7–23)
CALCIUM SERPL-MCNC: 9.2 MG/DL — SIGNIFICANT CHANGE UP (ref 8.4–10.5)
CHLORIDE SERPL-SCNC: 101 MMOL/L — SIGNIFICANT CHANGE UP (ref 96–108)
CO2 SERPL-SCNC: 27 MMOL/L — SIGNIFICANT CHANGE UP (ref 22–31)
CREAT SERPL-MCNC: 0.88 MG/DL — SIGNIFICANT CHANGE UP (ref 0.5–1.3)
EOSINOPHIL # BLD AUTO: 0.03 K/UL — SIGNIFICANT CHANGE UP (ref 0–0.5)
EOSINOPHIL NFR BLD AUTO: 0.2 % — SIGNIFICANT CHANGE UP (ref 0–6)
GLUCOSE SERPL-MCNC: 115 MG/DL — HIGH (ref 70–99)
HCT VFR BLD CALC: 35.2 % — LOW (ref 39–50)
HGB BLD-MCNC: 11.5 G/DL — LOW (ref 13–17)
IMM GRANULOCYTES NFR BLD AUTO: 1.9 % — HIGH (ref 0–1.5)
LYMPHOCYTES # BLD AUTO: 14 % — SIGNIFICANT CHANGE UP (ref 13–44)
LYMPHOCYTES # BLD AUTO: 2.1 K/UL — SIGNIFICANT CHANGE UP (ref 1–3.3)
MCHC RBC-ENTMCNC: 26.7 PG — LOW (ref 27–34)
MCHC RBC-ENTMCNC: 32.7 GM/DL — SIGNIFICANT CHANGE UP (ref 32–36)
MCV RBC AUTO: 81.9 FL — SIGNIFICANT CHANGE UP (ref 80–100)
MONOCYTES # BLD AUTO: 1.27 K/UL — HIGH (ref 0–0.9)
MONOCYTES NFR BLD AUTO: 8.4 % — SIGNIFICANT CHANGE UP (ref 2–14)
NEUTROPHILS # BLD AUTO: 11.32 K/UL — HIGH (ref 1.8–7.4)
NEUTROPHILS NFR BLD AUTO: 75.3 % — SIGNIFICANT CHANGE UP (ref 43–77)
NRBC # BLD: 0 /100 WBCS — SIGNIFICANT CHANGE UP (ref 0–0)
PLATELET # BLD AUTO: 248 K/UL — SIGNIFICANT CHANGE UP (ref 150–400)
POTASSIUM SERPL-MCNC: 4 MMOL/L — SIGNIFICANT CHANGE UP (ref 3.5–5.3)
POTASSIUM SERPL-SCNC: 4 MMOL/L — SIGNIFICANT CHANGE UP (ref 3.5–5.3)
RBC # BLD: 4.3 M/UL — SIGNIFICANT CHANGE UP (ref 4.2–5.8)
RBC # FLD: 13.6 % — SIGNIFICANT CHANGE UP (ref 10.3–14.5)
SODIUM SERPL-SCNC: 138 MMOL/L — SIGNIFICANT CHANGE UP (ref 135–145)
WBC # BLD: 15.03 K/UL — HIGH (ref 3.8–10.5)
WBC # FLD AUTO: 15.03 K/UL — HIGH (ref 3.8–10.5)

## 2021-07-16 PROCEDURE — 80048 BASIC METABOLIC PNL TOTAL CA: CPT

## 2021-07-16 PROCEDURE — 97530 THERAPEUTIC ACTIVITIES: CPT

## 2021-07-16 PROCEDURE — 86850 RBC ANTIBODY SCREEN: CPT

## 2021-07-16 PROCEDURE — 36415 COLL VENOUS BLD VENIPUNCTURE: CPT

## 2021-07-16 PROCEDURE — 86900 BLOOD TYPING SEROLOGIC ABO: CPT

## 2021-07-16 PROCEDURE — C1889: CPT

## 2021-07-16 PROCEDURE — 76000 FLUOROSCOPY <1 HR PHYS/QHP: CPT

## 2021-07-16 PROCEDURE — 97116 GAIT TRAINING THERAPY: CPT

## 2021-07-16 PROCEDURE — C1713: CPT

## 2021-07-16 PROCEDURE — 97161 PT EVAL LOW COMPLEX 20 MIN: CPT

## 2021-07-16 PROCEDURE — 86901 BLOOD TYPING SEROLOGIC RH(D): CPT

## 2021-07-16 PROCEDURE — 72100 X-RAY EXAM L-S SPINE 2/3 VWS: CPT

## 2021-07-16 PROCEDURE — 86769 SARS-COV-2 COVID-19 ANTIBODY: CPT

## 2021-07-16 PROCEDURE — 85025 COMPLETE CBC W/AUTO DIFF WBC: CPT

## 2021-07-16 RX ORDER — MULTIVIT WITH MIN/MFOLATE/K2 340-15/3 G
1 POWDER (GRAM) ORAL ONCE
Refills: 0 | Status: COMPLETED | OUTPATIENT
Start: 2021-07-16 | End: 2021-07-16

## 2021-07-16 RX ADMIN — Medication 4 MILLIGRAM(S): at 05:29

## 2021-07-16 RX ADMIN — Medication 4 MILLIGRAM(S): at 13:16

## 2021-07-16 RX ADMIN — Medication 1 BOTTLE: at 11:09

## 2021-07-16 RX ADMIN — OXYCODONE HYDROCHLORIDE 10 MILLIGRAM(S): 5 TABLET ORAL at 02:15

## 2021-07-16 RX ADMIN — OXYCODONE HYDROCHLORIDE 10 MILLIGRAM(S): 5 TABLET ORAL at 15:35

## 2021-07-16 RX ADMIN — Medication 1 TABLET(S): at 13:16

## 2021-07-16 RX ADMIN — Medication 975 MILLIGRAM(S): at 05:29

## 2021-07-16 RX ADMIN — OXYCODONE HYDROCHLORIDE 10 MILLIGRAM(S): 5 TABLET ORAL at 11:23

## 2021-07-16 RX ADMIN — CYCLOBENZAPRINE HYDROCHLORIDE 5 MILLIGRAM(S): 10 TABLET, FILM COATED ORAL at 05:29

## 2021-07-16 RX ADMIN — FAMOTIDINE 20 MILLIGRAM(S): 10 INJECTION INTRAVENOUS at 13:16

## 2021-07-16 RX ADMIN — Medication 975 MILLIGRAM(S): at 06:29

## 2021-07-16 RX ADMIN — Medication 975 MILLIGRAM(S): at 13:50

## 2021-07-16 RX ADMIN — OXYCODONE HYDROCHLORIDE 10 MILLIGRAM(S): 5 TABLET ORAL at 05:29

## 2021-07-16 RX ADMIN — Medication 975 MILLIGRAM(S): at 13:16

## 2021-07-16 RX ADMIN — OXYCODONE HYDROCHLORIDE 10 MILLIGRAM(S): 5 TABLET ORAL at 12:00

## 2021-07-16 RX ADMIN — PANTOPRAZOLE SODIUM 40 MILLIGRAM(S): 20 TABLET, DELAYED RELEASE ORAL at 05:29

## 2021-07-16 RX ADMIN — POLYETHYLENE GLYCOL 3350 17 GRAM(S): 17 POWDER, FOR SOLUTION ORAL at 13:16

## 2021-07-16 RX ADMIN — OXYCODONE HYDROCHLORIDE 10 MILLIGRAM(S): 5 TABLET ORAL at 01:15

## 2021-07-16 RX ADMIN — OXYCODONE HYDROCHLORIDE 10 MILLIGRAM(S): 5 TABLET ORAL at 06:29

## 2021-07-16 RX ADMIN — CYCLOBENZAPRINE HYDROCHLORIDE 5 MILLIGRAM(S): 10 TABLET, FILM COATED ORAL at 13:16

## 2021-07-16 NOTE — DIETITIAN INITIAL EVALUATION ADULT. - PROBLEM SELECTOR PLAN 1
Admit to Orthopedic service  For elective L5-S1 decompression and fusion   Medically cleared for surgery by Dr. Baca

## 2021-07-16 NOTE — DISCHARGE NOTE NURSING/CASE MANAGEMENT/SOCIAL WORK - PATIENT PORTAL LINK FT
You can access the FollowMyHealth Patient Portal offered by [facility] by registering at the following website: [email or web address]. By joining [facility]’s FollowMyHealth portal, you will also be able to view your health information using other applications (apps) compatible with our system.

## 2021-07-16 NOTE — DIETITIAN INITIAL EVALUATION ADULT. - OTHER INFO
41M, Croatian speaking, with no pertient medical history presenting with back pain, worsening without improvement despite conservative therapies now s/p elective L5-S1 decompression and fusion 7/13. Pt planned for d/c pending PT clearance and drain removal.     On assessment, pt resting in bed without complaints. [place]  used. Currently on regular diet tolerating Po well. Pt consistently meeting >50% of meals. No reported n/v/d- started on bowel regime. No abd pain or distention noted. Education provided on importance of adequate PO intake with emphasis on lean protein to support wound healing- pt receptive. Pt noting good appetite PTA. UBW is consistent with admission weight- pt denies weight changes. NKFA. Skin: Surgical incisions, marjorie score 20. No pain noted at this time- well controlled at time of assessment. Please see nutrition recs below. RD to follow

## 2021-07-16 NOTE — PROGRESS NOTE ADULT - SUBJECTIVE AND OBJECTIVE BOX
Ortho Note    Pt comfortable without complaints, pain controlled  Denies CP, SOB, N/V, numbness/tingling     Vital Signs Last 24 Hrs  T(C): 36.6 (07-16-21 @ 08:29), Max: 36.8 (07-16-21 @ 04:56)  T(F): 97.9 (07-16-21 @ 08:29), Max: 98.2 (07-16-21 @ 04:56)  HR: 64 (07-16-21 @ 08:29) (63 - 64)  BP: 103/61 (07-16-21 @ 08:29) (103/61 - 114/69)  BP(mean): --  RR: 16 (07-16-21 @ 08:29) (16 - 16)  SpO2: 95% (07-16-21 @ 08:29) (95% - 96%)  AVSS    General: Pt Alert and oriented, NAD  DSG: lumbar IOBAN C/D/I, HV x1 to suction  Pulses: bilateral pedal 2+, wwp toes, cap refill < 3sec toes  Sensation: bilateral SILT  Motor: bilateral 5/5 EHL/FHL/TA/GS    07-16    138  |  101  |  16  ----------------------------<  115<H>  4.0   |  27  |  0.88    Ca    9.2      16 Jul 2021 07:44                            11.5   15.03 )-----------( 248      ( 16 Jul 2021 07:44 )             35.2       A/P: 41y Male POD#3 s/p L5-S1  - Stable, afebrile, nontoxic appearance, IS encouraged  - Pain Control: PO meds  - DVT ppx: SCDS  - PT, WBS: WBAT spinal prectuions  -Bowel regimen: continue bowel regimen  -Dispo: home today pending PT clearance    Ortho Pager [phone]
Patient seen by Dr King,  pain  controlled  Denies CP, SOB, N/V, numbness/tingling     general: Pt Alert and oriented, NAD  DSG: lumbar IOBAN C/D/I, HV x1 to suction  Pulses: bilateral pedal 2+, cap refill < 3sec toes, wwp toes  Sensation: bilateral SILT  Motor: LLE 5-/5 EHL/FHL/TA/GS      A/P:   - Stable, afebrile nontoxic appearance, IS encouraged  - Pain Control: po meds  - xray   - DVT ppx: SCDs  - PT, WBS: WBAT spinal precautions  -Bowel regimen: continue bowel regimen  -Dispo: pending PT eval 
  Pain Management Progress Note - [facility] [phone]    HPI: Patient seen and examined today, laying in bed. Patient POD 1 s/p lumbar decompression and fusion L5-S1 with Dr. King. Patient reports moderate back pain, rating the pain a 5 out of 10. Patient reports Dilaudid PCA helps with the pain. Discussed with patient plan to discontinue PCA and start oral pain regimen. Patient questions were answered and patient verbalized understanding. Patient denies side effects from current pain regimen.   reference ID: [id number]    Pertinent PMH: Pain at: __x_Back ___Neck___Knee ___Hip ___Shoulder ___ Opioid tolerance    Pain is _x_ sharp ____dull ___burning ___achy ___ Intensity: ____ mild _x___mod ___x_severe     Location _x____surgical site _____cervical ___x__lumbar ____abd _____upper ext____lower ext    Worse with __x__activity __x__movement _____physical therapy___ Rest    Improved with __x__medication __x__rest ____physical therapy    PMH:  No pertinent past medical history  History of surgery  left arm surgery    Medications:  HYDROmorphone  Injectable  oxyCODONE    IR  methylPREDNISolone  bisacodyl Suppository  polyethylene glycol 3350  glycopyrrolate Injectable  cyclobenzaprine  acetaminophen   Tablet ..  ondansetron Injectable  naloxone Injectable  HYDROmorphone PCA (1 mG/mL) Rescue Clinician Bolus  HYDROmorphone PCA (1 mG/mL)  HYDROmorphone  Injectable  multivitamin  senna  dexAMETHasone  IVPB  ceFAZolin   IVPB  lactated ringers.  amitriptyline  aprepitant  acetaminophen   Tablet ..  gabapentin  chlorhexidine 2% Cloths  povidone iodine 5% Nasal Swab    ROS: Const:  _N__febrile   Eyes:_N__ENT:___CV: _N__chest pain  Resp: __N__sob  GI:__N_nausea _N__vomiting __N__abd pain ___npo ___clears ___Yfull diet __bm  :_N__ Musk: _Y__pain ___spasm  Skin:__N_ Neuro:  N___sedation___confusion____ numbness ___weakness ___paresthesia  Psych:___anxiety  Endo:___ Heme:__N_Allergy:_NKDA__      07-14 @ 07:46819 mL/min/1.73M2  Hemoglobin: 12.5 g/dL (07-14 @ 07:16)  T(C): 36.6 (07-14-21 @ 08:05), Max: 36.9 (07-14-21 @ 01:16)  HR: 85 (07-14-21 @ 08:05) (44 - 85)  BP: 136/74 (07-14-21 @ 08:05) (83/48 - 137/73)  RR: 17 (07-14-21 @ 08:05) (14 - 32)  SpO2: 97% (07-14-21 @ 08:05) (93% - 100%)  Wt(kg): --     PHYSICAL EXAM:  Gen Appearance: ___no acute distress __x_appropriate       Neuro: __x_SILT feet____ EOM Intact Psych: AAOX_3_, __x_mood/affect appropriate        Eyes: __x_conjunctiva WNL  __x___ Pupils equal and round        ENT: _x__ears and nose atraumatic__x_ Hearing grossly intact        Neck: __x_trachea midline, no visible masses ___thyroid without palpable mass    Resp: __x_Nml WOB____No tactile fremitus ___clear to auscultation    Cardio: __x_extremities free from edema ____pedal pulses palpable    GI/Abdomen: ___soft _____ Nontender_____x_Nondistended_____HSM    Lymphatic: ___no palpable nodes in neck  ___no palpable nodes calves and feet    Skin/Wound: ___Incision, _x__Dressing c/d/i,   ___x_surrounding tissues soft,  __x_drain/chest tube present____    Muscular: EHL _5__/5  Gastrocnemius__5_/5    _x__absent clubbing/cyanosis         ASSESSMENT:  This is a 41y old Male with no PMH scheduled for lumbar decompression and fusion L5-S1 with Dr. King, with some back pain.    Recommended Treatment PLAN:  1. Discontinue PCA  2. Start Oxycodone 5-10 mg PO q4h prn moderate-severe pain  3. Start Dilaudid 0.5 mg IVP q2h prn breakthrough pain  4. Continue Flexeril 5 mg po tid  5. Continue Tylenol 975 mg po tid  Plan discussed with Dr. Graves  
Ortho Post Op Check    Procedure: Lami/Fusion L5-S1  Surgeon: Dr. King    Pt comfortable without complaints, pain controlled.  Denies CP, SOB, N/V, numbness/tingling .      Physical Exam:  Vital Signs Last 24 Hrs  T(C): 36.7 (07-13-21 @ 23:00), Max: 36.7 (07-13-21 @ 23:00)  T(F): 98.1 (07-13-21 @ 23:00), Max: 98.1 (07-13-21 @ 23:00)  HR: 79 (07-13-21 @ 23:00) (44 - 83)  BP: 119/70 (07-13-21 @ 23:00) (83/48 - 137/73)  BP(mean): 87 (07-13-21 @ 23:00) (63 - 95)  RR: 19 (07-13-21 @ 23:00) (14 - 32)  SpO2: 95% (07-13-21 @ 23:00) (93% - 100%)  I&O's Summary    13 Jul 2021 07:01  -  14 Jul 2021 00:50  --------------------------------------------------------  IN: 4040 mL / OUT: 1480 mL / NET: 2560 mL        General: Pt Alert and oriented, NAD  DSG C/D/I, HVx1  Pulses: 2+ DP pulses intact bilaterally warm and well perfused  Sensation: SILT in bilateral lower extremities  Motor: 5/5 strength EHL/FHL/TA/GS bilaterally      A/P: 41yMale s/p Lami/Fusion L5-S1 7/13/21.  - Stable  - Pain Control  - DVT ppx: SCDs  - Post op abx: Ancef  - PT, WBS: WBAT  - Dispo: Home    Ortho Pager [phone]
Ortho Note    Pt comfortable without complaints, pain controlled  Denies CP, SOB, N/V, numbness/tingling     Vital Signs Last 24 Hrs  T(C): 36.6 (07-14-21 @ 08:05), Max: 36.6 (07-14-21 @ 08:05)  T(F): 97.8 (07-14-21 @ 08:05), Max: 97.8 (07-14-21 @ 08:05)  HR: 85 (07-14-21 @ 08:05) (56 - 85)  BP: 136/74 (07-14-21 @ 08:05) (120/68 - 136/74)  BP(mean): 85 (07-14-21 @ 06:20) (85 - 85)  RR: 17 (07-14-21 @ 08:05) (17 - 17)  SpO2: 97% (07-14-21 @ 08:05) (95% - 97%)  AVSS    General: Pt Alert and oriented, NAD  DSG: lumbar IOBAN C/D/I, HV x1 to suction  Pulses: bilateral pedal 2+< wwp toes, cap refill < 3sec toes  Sensation: bilateral SILT  Motor: bilateral 5/5 EHL/FHL/TA/GS    07-14    136  |  101  |  12  ----------------------------<  149<H>  4.2   |  23  |  0.81    Ca    9.4      14 Jul 2021 07:16                            12.5   18.24 )-----------( 255      ( 14 Jul 2021 07:16 )             38.0       A/P: 41y Male POD#1 s/p lami/PSF L5-S1  - Stable, afebrile, nontoxic appearance, IS encouraged  - Pain Control: po meds  - urinary retention- patient voided small amount in am, encouraged patient to ambulate  - DVT ppx: SCDS  - PT, WBS: WBAT spinal precautions  -Bowel regimen: continue bowel regimen  -Dispo: pending PT eval and drain output    Ortho Pager [phone]
Patient seen by Dr King,  pain  controlled  Denies CP, SOB, N/V, numbness/tingling     general: Pt Alert and oriented, NAD  DSG: lumbar IOBAN C/D/I, HV x1 to suction  Pulses: bilateral pedal 2+, cap refill < 3sec toes, wwp toes  Sensation: bilateral SILT  Motor: LLE 5-/5 EHL/FHL/TA/GS      A/P:   - Stable, afebrile nontoxic appearance, IS encouraged  - Pain Control: po meds  - xray   - DVT ppx: SCDs  - PT, WBS: WBAT spinal precautions  -Bowel regimen: continue bowel regimen  -Dispo: pending PT eval     
ortho Note    Patient seen by Dr King,  pain  controlled  Denies CP, SOB, N/V, numbness/tingling     general: Pt Alert and oriented, NAD  DSG: lumbar IOBAN C/D/I, HV x1 to suction  Pulses: bilateral pedal 2+, cap refill < 3sec toes, wwp toes  Sensation: bilateral SILT  Motor: LLE 5-/5 EHL/FHL/TA/GS      A/P:   - Stable, afebrile nontoxic appearance, IS encouraged  - Pain Control: po meds  - xray   - DVT ppx: SCDs  - PT, WBS: WBAT spinal precautions  -Bowel regimen: continue bowel regimen  -Dispo: pending PT eval 
Ortho Note    Pt comfortable without complaints, pain controlled  Denies CP, SOB, N/V, numbness/tingling     Vital Signs Last 24 Hrs  T(C): 36.5 (07-15-21 @ 05:15), Max: 36.5 (07-15-21 @ 05:15)  T(F): 97.7 (07-15-21 @ 05:15), Max: 97.7 (07-15-21 @ 05:15)  HR: 64 (07-15-21 @ 05:15) (64 - 64)  BP: 109/64 (07-15-21 @ 05:15) (109/64 - 109/64)  BP(mean): 79 (07-15-21 @ 05:15) (79 - 79)  RR: 16 (07-15-21 @ 05:15) (16 - 16)  SpO2: 95% (07-15-21 @ 05:15) (95% - 95%)  AVSS    General: Pt Alert and oriented, NAD  DSG: lumbar IOBAN C/D/I, HV x1 to suction  Pulses: bilateral pedal 2+, wwp toes, cap refill < 3sec toes  Sensation: bilateral LEs SILT  Motor: bilateral 5/5 EHL/FHL/TA/GS    07-15    138  |  102  |  16  ----------------------------<  136<H>  4.0   |  24  |  0.81    Ca    9.3      15 Jul 2021 06:56                            11.6   26.89 )-----------( 253      ( 15 Jul 2021 06:56 )             35.8       A/P: 41y Male POD#2 s/p L5-S1  - Stable, afebrile, nontoxic appearance, IS encouraged  - Pain Control: po meds  - DVT ppx: SCDS  - PT, WBS: WBAT   -Bowel regimen: increased bowel regimen  -Dispo: home pending PT clearance and drain output    Ortho Pager [phone]

## 2021-07-22 DIAGNOSIS — M54.16 RADICULOPATHY, LUMBAR REGION: ICD-10-CM

## 2021-07-22 DIAGNOSIS — R33.9 RETENTION OF URINE, UNSPECIFIED: ICD-10-CM

## 2021-07-22 DIAGNOSIS — Z87.891 PERSONAL HISTORY OF NICOTINE DEPENDENCE: ICD-10-CM

## 2022-04-12 NOTE — DIETITIAN INITIAL EVALUATION ADULT. - WEIGHT FOR BMI (KG)
Quality 431: Preventive Care And Screening: Unhealthy Alcohol Use - Screening: Patient not identified as an unhealthy alcohol user when screened for unhealthy alcohol use using a systematic screening method Quality 110: Preventive Care And Screening: Influenza Immunization: Influenza immunization was not ordered or administered, reason not given Detail Level: Detailed Quality 226: Preventive Care And Screening: Tobacco Use: Screening And Cessation Intervention: Tobacco Screening not Performed for Medical Reasons 77.1